# Patient Record
Sex: FEMALE | Race: WHITE | HISPANIC OR LATINO | Employment: UNEMPLOYED | URBAN - METROPOLITAN AREA
[De-identification: names, ages, dates, MRNs, and addresses within clinical notes are randomized per-mention and may not be internally consistent; named-entity substitution may affect disease eponyms.]

---

## 2021-08-16 ENCOUNTER — HOSPITAL ENCOUNTER (EMERGENCY)
Facility: HOSPITAL | Age: 56
Discharge: HOME | End: 2021-08-16
Attending: EMERGENCY MEDICINE | Admitting: EMERGENCY MEDICINE

## 2021-08-16 ENCOUNTER — APPOINTMENT (OUTPATIENT)
Dept: MRI IMAGING | Facility: HOSPITAL | Age: 56
End: 2021-08-16

## 2021-08-16 VITALS
SYSTOLIC BLOOD PRESSURE: 121 MMHG | OXYGEN SATURATION: 98 % | TEMPERATURE: 98 F | DIASTOLIC BLOOD PRESSURE: 88 MMHG | HEART RATE: 71 BPM | RESPIRATION RATE: 18 BRPM

## 2021-08-16 DIAGNOSIS — M54.41 ACUTE RIGHT-SIDED LOW BACK PAIN WITH RIGHT-SIDED SCIATICA: ICD-10-CM

## 2021-08-16 DIAGNOSIS — M54.16 RIGHT LUMBAR RADICULOPATHY: Primary | ICD-10-CM

## 2021-08-16 PROCEDURE — 96372 THER/PROPH/DIAG INJ SC/IM: CPT

## 2021-08-16 PROCEDURE — 72148 MRI LUMBAR SPINE W/O DYE: CPT

## 2021-08-16 PROCEDURE — 99283 EMERGENCY DEPT VISIT LOW MDM: CPT

## 2021-08-16 PROCEDURE — 25010000002 MORPHINE PER 10 MG: Performed by: NURSE PRACTITIONER

## 2021-08-16 PROCEDURE — 63710000001 ONDANSETRON ODT 4 MG TABLET DISPERSIBLE: Performed by: NURSE PRACTITIONER

## 2021-08-16 RX ORDER — METHYLPREDNISOLONE 4 MG/1
TABLET ORAL
Qty: 1 EACH | Refills: 0 | Status: SHIPPED | OUTPATIENT
Start: 2021-08-16 | End: 2022-11-13

## 2021-08-16 RX ORDER — METHOCARBAMOL 750 MG/1
750 TABLET, FILM COATED ORAL 3 TIMES DAILY PRN
Qty: 21 TABLET | Refills: 0 | Status: SHIPPED | OUTPATIENT
Start: 2021-08-16 | End: 2022-11-13

## 2021-08-16 RX ORDER — MORPHINE SULFATE 2 MG/ML
4 INJECTION, SOLUTION INTRAMUSCULAR; INTRAVENOUS ONCE
Status: COMPLETED | OUTPATIENT
Start: 2021-08-16 | End: 2021-08-16

## 2021-08-16 RX ORDER — ONDANSETRON 4 MG/1
4 TABLET, ORALLY DISINTEGRATING ORAL ONCE
Status: COMPLETED | OUTPATIENT
Start: 2021-08-16 | End: 2021-08-16

## 2021-08-16 RX ADMIN — MORPHINE SULFATE 4 MG: 2 INJECTION, SOLUTION INTRAMUSCULAR; INTRAVENOUS at 13:51

## 2021-08-16 RX ADMIN — ONDANSETRON 4 MG: 4 TABLET, ORALLY DISINTEGRATING ORAL at 13:50

## 2021-08-16 NOTE — ED TRIAGE NOTES
Pt to ER via PV. Pt states right lower back pain that started this past Thursday and goes down leg. Pt states it started after picking up granddaughter    Patient in mask. This RN in appropriate PPE throughout the patient's entire encounter.

## 2021-08-16 NOTE — ED PROVIDER NOTES
EMERGENCY DEPARTMENT ENCOUNTER    Room Number:  B08/08  Date of encounter:  8/16/2021  PCP: Provider, No Known  Historian: patient   Full history not obtainable due to: none     HPI:  Chief Complaint: Low back pain     Context: Sabrina Osman is a 56 y.o. female who presents to the ED c/o low back pain onset Thursday, 4 days prior. Pain is constant. Located in the right lower back. Radiates to posterior aspect of the RLE. Worse with walking and moving. She tried one of her friends Flexeril tablets which did help her pain a little.  No falls. No injury. No weakness. No fever. Does endorse tingling in her vagina. No incontinence.    Hx of intermittent back pain but no prior surgeries. No followed by pain management or spine surgery.     PAST MEDICAL HISTORY    Active Ambulatory Problems     Diagnosis Date Noted   • No Active Ambulatory Problems     Resolved Ambulatory Problems     Diagnosis Date Noted   • No Resolved Ambulatory Problems     No Additional Past Medical History         PAST SURGICAL HISTORY  No past surgical history on file.      FAMILY HISTORY  No family history on file.      SOCIAL HISTORY  Social History     Socioeconomic History   • Marital status: Single     Spouse name: Not on file   • Number of children: Not on file   • Years of education: Not on file   • Highest education level: Not on file         ALLERGIES  Patient has no known allergies.        REVIEW OF SYSTEMS  Review of Systems   All systems reviewed and marked as negative except as listed in HPI       PHYSICAL EXAM    I have reviewed the triage vital signs and nursing notes.    ED Triage Vitals [08/16/21 1152]   Temp Heart Rate Resp BP SpO2   98 °F (36.7 °C) 106 18 -- 98 %      Temp src Heart Rate Source Patient Position BP Location FiO2 (%)   -- -- -- -- --       GENERAL: alert well developed, well nourished in no distress  HENT: NCAT, neck supple, trachea midline  EYES: no scleral icterus, PERRL, normal conjunctivae  CV: regular rhythm,  regular rate, no murmur  RESPIRATORY: unlabored effort, CTAB  ABDOMEN: soft, nontender, nondistended, bowel sounds present  MUSCULOSKELETAL: no gross deformity. No vertebral tenderness on palpation. No step off or crepitus. No spasm of the parspinous  muscles. SLR +at 20degrees RLE, LLE negative. Normal strength of the bilateral lower extremities. Unable to elicit Patellar DTR bilaterally. DP and PT pulse 2+. No foot drop.  NEURO: alert,  sensory and motor function of extremities grossly intact, speech clear, mental status normal/baseline  SKIN: warm, dry, no rash  PSYCH:  Appropriate mood and affect    Vital signs and nursing notes reviewed.          LAB RESULTS  No results found for this or any previous visit (from the past 24 hour(s)).    Ordered the above labs and independently reviewed the results.        RADIOLOGY  MRI Lumbar Spine Without Contrast    Result Date: 8/16/2021  EMERGENCY MRI OF THE LUMBAR SPINE WITHOUT CONTRAST 08/16/2021  CLINICAL HISTORY: Patient has low back pain the past 4 days that radiates down posterior right lower extremity and has saddle anesthesia, rule out cauda equina syndrome.  TECHNIQUE: Sagittal T1, proton density and fat-suppressed T2-weighted images were obtained of the lumbar spine. In addition axial T2-weighted images were obtained from L1 S2, thin cut axial T1-weighted images were obtained angled through the interspaces from L1-S1.  COMPARISON: There are no prior MRIs of the lumbar spine for comparison.  FINDINGS: The distal thoracic cord and the conus is normal in signal intensity. The conus terminates at the L1-2 interspace level which is normal.  At T11-12 no axial images were obtained but based on the sagittal images the disc space and facets appear normal with no canal or foraminal narrowing.  At T12-L1 no axial images were obtained but based on the sagittal images the disc space and facets appear normal with no canal or foraminal narrowing.  At L1-2 the disc space and  facets are normal with no canal or foraminal narrowing.  At L2-3 the disc space and facets are normal with no canal or foraminal narrowing.  At L3-4 there is some mild disc desiccation. There is some T2 high signal in the posterior central annulus compatible with posterior central annular fissure or tear with a tiny posterior central disc protrusion that only minimally indents the anterior midline thecal sac. There is essentially no canal or foraminal narrowing.  At L4-5 there is minimal bilateral facet overgrowth. There is mild disc space narrowing, there are some degenerative endplate changes. There is some focal T2 high signal with T1 low signal in the anterior endplates compatible with some focal marrow edema in the anterior endplates that is probably degenerative marrow edema. There is 2 mm retrolisthesis of L4 on L5 and there is a posterior central disc protrusion or small disc herniation that indents the anterior midline of the thecal sac, only mildly narrows the thecal sac, comes close to the anteromedial aspect of the traversing right L5 nerve root but does not appear to compress it. There is no left foraminal narrowing. There is slight spurring and bulging disc material into the right foramen with mild right foraminal narrowing.  At L5-S1 there is mild disc space narrowing and mild posterior disc bulge, minimal facet overgrowth. There is no canal or lateral recess narrowing and there is some minimal spurring and bulging disc material in the foramen and mild bilateral foraminal narrowing.  Paravertebral soft tissue structures appear normal.      1. Disc spaces and facets are normal with no canal or foraminal narrowing from T11 to L3. 2. There is a posterior central annular fissure or tear at L3-4 with posterior central disc bulge or small disc protrusion that only minimally indents the anterior midline of the thecal sac but there is no canal or foraminal narrowing at L3-4. 3. There is some disc space  narrowing and mild degenerative endplate changes at L4-5 with some degenerative marrow edema in the anterior endplates. There is a 2 mm retrolisthesis of L4 with respect to L5 and there is diffuse posterior disc bulge with eccentric bulging or mildly protruding disc material right paracentral and posterolaterally mildly narrows the central right side of the thecal sac and may abut the ventral aspect of the traversing right L5 nerve root but does not appear to compress it. There is some mild spurring and bulging disc material into the right neural foramen with mild right foraminal narrowing at L4-5. 4. At L5-S1 there is mild diffuse posterior disc bulge that abuts the ventral aspect of the traversing S1 nerve roots but does not compress them. There is no canal or lateral recess narrowing. There is some spurring and bulging disc material into the foramina with mild bilateral foraminal narrowing. The remainder of the lumbar spine MRI is unremarkable. The results were communicated to CRISTOPHER Shaffer by telephone on 08/16/2021 at 3 PM        I ordered the above noted radiological studies. Independently reviewed by me and discussed with radiologist.  See dictation above for official radiology interpretation.      PROCEDURES    Procedures        MEDICATIONS GIVEN IN ER    Medications   morphine injection 4 mg (4 mg Intramuscular Given 8/16/21 1351)   ondansetron ODT (ZOFRAN-ODT) disintegrating tablet 4 mg (4 mg Oral Given 8/16/21 1350)         PROGRESS, DATA ANALYSIS, CONSULTS, AND MEDICAL DECISION MAKING    All labs have been independently reviewed by me.  All radiology studies have been reviewed by me.   EKG's independently reviewed by me.  Discussion below represents my analysis of pertinent findings related to patient's condition, differential diagnosis, treatment plan and final disposition.    DIFFERENTIAL DIAGNOSIS INCLUDE BUT NOT LIMITED TO: Lumbago, muscle spasm, vertebral fracture, spinal stenosis,  lumbar radiculopathy, cauda equina syndrome, DDD, AAA, retroperitoneal hematoma, SBO, diverticulitis, UTI      ED Course as of Aug 16 2011   Mon Aug 16, 2021   1330 Given complaint of saddle anesthesia and hyporeflexia, will proceed with MRI lumbar spine.     [JS]   1500 I discussed patient with Dr. Layton who states the patient has findings of disc protrusion at L4 L5-S1 but no definite innervation of the exiting nerve root.    [JS]   1530 Discussed patient with Lola for neurosurgery.  She is on-call for Dr. Osullivan.  She states patient is appropriate for discharge.  They can follow-up in the office for clinic appointment.  She is in agreement with plan for steroids and muscle relaxers.  She states her doctor is reviewed the films and there is no indication of cauda equina syndrome.    [JS]   1545 I discussed MRI results with the patient and plan for discharge from the emergency department.  She affirms she is able to walk although she does report pain with ambulation.  We discussed close follow-up with her family physician and referral to neurosurgery.  She will be given prescriptions for steroids and muscle relaxers and activity restriction.  Return precautions given she is understanding the plan.    [JS]      ED Course User Index  [JS] Delphine Flores APRN       AS OF 20:11 EDT VITALS:        BP - 121/88  HR - 71  TEMP - 98 °F (36.7 °C)  O2 SATS - 98%         Medication List      New Prescriptions    methocarbamol 750 MG tablet  Commonly known as: ROBAXIN  Take 1 tablet by mouth 3 (Three) Times a Day As Needed for Muscle Spasms.     methylPREDNISolone 4 MG dose pack  Commonly known as: MEDROL  Take as directed on package instructions.           Where to Get Your Medications      You can get these medications from any pharmacy    Bring a paper prescription for each of these medications  · methocarbamol 750 MG tablet  · methylPREDNISolone 4 MG dose pack           DIAGNOSIS  Final diagnoses:   Right lumbar  radiculopathy   Acute right-sided low back pain with right-sided sciatica         DISPOSITION  Discharge    Pt masked in first look. I wore appropriate PPE throughout my encounters with the pt. I performed hand hygiene on entry into the pt room and upon exit.     Dictated utilizing Dragon dictation:  Much of this encounter note is an electronic transcription/translation of spoken language to printed text. The electronic translation of spoken language may permit erroneous, or at times, nonsensical words or phrases to be inadvertently transcribed; Although I have reviewed the note for such errors, some may still exist.     Delphine Flores, CRISTOPHER  08/16/21 2011

## 2021-08-16 NOTE — ED PROVIDER NOTES
I have supervised the care provided by the midlevel provider.    We have discussed this patient's history, physical exam, and treatment plan.   I have reviewed the note and have personally examined the patient and agree with the plan of care.  See attached attending note.  My personal findings are below:    Patient complains of right-sided low back pain for the past 4 days.  Denies recent injury.  Pain radiates into the right buttock and right thigh.  Reports tingling in her vagina.  Denies loss of bowel/bladder control, fever, or numbness/tingling/weakness in her legs.    On exam: Awake and alert.  Heart is regular rate and rhythm.  Lungs are clear bilaterally.  Abdomen soft and nontender.  Back is nontender.  There is normal strength with bilateral hip flexion/extension, knee flexion/extension, dorsi/plantarflexion of the ankle, and EHL.  Normal light touch sensation of both lower extremities.        MRI of the lumbar spine shows a posterior central disc protrusion at L4/5 that comes close to the anterior medial aspect of the traversing right L5 nerve root.  Full report for details.     Oskar Greenberg MD  08/16/21 3343

## 2021-08-16 NOTE — ED NOTES
Pt reports R sided lower back pain with radiation into her right leg that started 5 days ago.       Sung Cagle, RN  08/16/21 3721

## 2021-08-18 ENCOUNTER — TELEPHONE (OUTPATIENT)
Dept: NEUROSURGERY | Facility: CLINIC | Age: 56
End: 2021-08-18

## 2021-08-18 NOTE — TELEPHONE ENCOUNTER
"PATIENT CALLED AGAIN TODAY, 48 HOURS AFTER INITIAL TELEPHONE ENCOUNTER. STATED SHE IS IN EXTREME AMOUNT OF PAIN AND IS NEEDING TO FOLLOW-UP FROM HER ED VISIT TO SEE DR. BANERJEE (NEUROSURGERY ON-CALL) AS A PART OF HER DISCHARGE INSTRUCTIONS.     WOULD HAVE SCHEDULED FOR HOSPITAL FOLLOW-UP, BUT UNABLE TO DUE TO APPT CRITERIA.     PLEASE ADVISE ON NEXT ACTION OR REACH OUT TO PATIENT, [phone].    THANK YOU.          Caller: Sabrina Osman    Relationship to patient: Self    Best call back number: [phone]    New or established patient?  [x] New  [x] Established    Date of discharge: 08/16/2021    Facility discharged from: [facility]    Diagnosis/Symptoms: Right lumbar radiculopathy with right sided sciatica    Length of stay (If applicable): <24 hours    Specialty Only: Did you see a [facility] health provider?    [x] Yes  [] No  If so, who? CRISTOPHER Velázquez consulted and stated the following:  \"Discussed patient with Lola for neurosurgery.  She is on-call for Dr. Osullivan.  She states patient is appropriate for discharge.  They can follow-up in the office for clinic appointment.  She is in agreement with plan for steroids and muscle relaxers.  She states her doctor is reviewed the films and there is no indication of cauda equina syndrome. \"    PLEASE CALL PATIENT TO SCHEDULE HOSPITAL FOLLOW-UP APPT.    THANK YOU.  "

## 2021-08-23 NOTE — TELEPHONE ENCOUNTER
PT CALLED AND STATES THAT SHE IS WAITING FOR A CALL BACK FROM OUR OFFICE FOR AN APPT. PT STATES THAT SHE IS ABOUT TO RUN OUT OF MEDICATION AND SHE IS IN SEVERE PAIN-PLEASE ADVISE THANK YOU!

## 2021-08-26 ENCOUNTER — OFFICE VISIT (OUTPATIENT)
Dept: NEUROSURGERY | Facility: CLINIC | Age: 56
End: 2021-08-26

## 2021-08-26 VITALS
HEIGHT: 65 IN | BODY MASS INDEX: 25.83 KG/M2 | SYSTOLIC BLOOD PRESSURE: 120 MMHG | WEIGHT: 155 LBS | HEART RATE: 89 BPM | TEMPERATURE: 97.8 F | DIASTOLIC BLOOD PRESSURE: 82 MMHG

## 2021-08-26 DIAGNOSIS — M51.16 NEURITIS OR RADICULITIS DUE TO RUPTURE OF LUMBAR INTERVERTEBRAL DISC: Primary | ICD-10-CM

## 2021-08-26 PROCEDURE — 99204 OFFICE O/P NEW MOD 45 MIN: CPT | Performed by: NEUROLOGICAL SURGERY

## 2021-08-27 ENCOUNTER — TELEPHONE (OUTPATIENT)
Dept: NEUROSURGERY | Facility: CLINIC | Age: 56
End: 2021-08-27

## 2021-08-31 ENCOUNTER — APPOINTMENT (OUTPATIENT)
Dept: PAIN MEDICINE | Facility: HOSPITAL | Age: 56
End: 2021-08-31

## 2021-09-13 ENCOUNTER — APPOINTMENT (OUTPATIENT)
Dept: PAIN MEDICINE | Facility: HOSPITAL | Age: 56
End: 2021-09-13

## 2021-09-27 ENCOUNTER — APPOINTMENT (OUTPATIENT)
Dept: PAIN MEDICINE | Facility: HOSPITAL | Age: 56
End: 2021-09-27

## 2022-05-13 ENCOUNTER — HOSPITAL ENCOUNTER (EMERGENCY)
Facility: HOSPITAL | Age: 57
End: 2022-05-13

## 2022-05-13 ENCOUNTER — HOSPITAL ENCOUNTER (OUTPATIENT)
Dept: CARDIOLOGY | Facility: HOSPITAL | Age: 57
Discharge: HOME | End: 2022-05-13
Admitting: FAMILY MEDICINE

## 2022-05-13 ENCOUNTER — TRANSCRIBE ORDERS (OUTPATIENT)
Dept: ADMINISTRATIVE | Facility: HOSPITAL | Age: 57
End: 2022-05-13

## 2022-05-13 DIAGNOSIS — Z01.810 PRE-OPERATIVE CARDIOVASCULAR EXAMINATION: ICD-10-CM

## 2022-05-13 DIAGNOSIS — Z01.818 PRE-OP TESTING: ICD-10-CM

## 2022-05-13 DIAGNOSIS — Z01.818 PRE-OP TESTING: Primary | ICD-10-CM

## 2022-05-13 LAB — QT INTERVAL: 393 MS

## 2022-05-13 PROCEDURE — 93010 ELECTROCARDIOGRAM REPORT: CPT | Performed by: INTERNAL MEDICINE

## 2022-05-13 PROCEDURE — 93005 ELECTROCARDIOGRAM TRACING: CPT | Performed by: FAMILY MEDICINE

## 2022-07-15 ENCOUNTER — TELEPHONE (OUTPATIENT)
Dept: NEUROLOGY | Facility: OTHER | Age: 57
End: 2022-07-15

## 2022-07-15 NOTE — TELEPHONE ENCOUNTER
PT CALLED TO GET AN MRI READ THAT SHE HAD DONE IN [place]. SHE WANTS TO DO A MOMMY MAKEOVER.    GAVE HER THE CENTRAL SCHEDULING NUMBER TO REACH A RADIOLOGIST.

## 2022-09-13 ENCOUNTER — HOSPITAL ENCOUNTER (EMERGENCY)
Facility: HOSPITAL | Age: 57
Discharge: LEFT BEFORE DISCHARGE | End: 2022-09-14

## 2022-09-13 VITALS
HEIGHT: 65 IN | TEMPERATURE: 97.2 F | WEIGHT: 155 LBS | DIASTOLIC BLOOD PRESSURE: 93 MMHG | BODY MASS INDEX: 25.83 KG/M2 | RESPIRATION RATE: 16 BRPM | OXYGEN SATURATION: 96 % | HEART RATE: 74 BPM | SYSTOLIC BLOOD PRESSURE: 127 MMHG

## 2022-09-13 PROCEDURE — 99211 OFF/OP EST MAY X REQ PHY/QHP: CPT

## 2022-09-14 ENCOUNTER — APPOINTMENT (OUTPATIENT)
Dept: GENERAL RADIOLOGY | Facility: HOSPITAL | Age: 57
End: 2022-09-14

## 2022-09-14 ENCOUNTER — HOSPITAL ENCOUNTER (EMERGENCY)
Facility: HOSPITAL | Age: 57
Discharge: LEFT BEFORE DISCHARGE | End: 2022-09-14

## 2022-09-14 VITALS — OXYGEN SATURATION: 99 % | HEART RATE: 75 BPM | TEMPERATURE: 97.8 F | RESPIRATION RATE: 16 BRPM

## 2022-09-14 PROCEDURE — 99211 OFF/OP EST MAY X REQ PHY/QHP: CPT

## 2022-09-14 PROCEDURE — 73030 X-RAY EXAM OF SHOULDER: CPT

## 2022-09-14 NOTE — ED TRIAGE NOTES
Patient to ER via car from home. Patient was here in the middle of the night and left without being seen after xray. Patient states that she has right shoulder pain that started yesterday around 3pm with no known injury    Patient wearing mask this RN in PPE

## 2022-09-14 NOTE — ED TRIAGE NOTES
Pt states around 1530 this afternoon she has left shouder pain that has not gotten any better with Motrin. Pt states that it is hard to move her arm now since the arm pain this afternoon.     Pt masked and staff masked.

## 2022-11-13 ENCOUNTER — HOSPITAL ENCOUNTER (EMERGENCY)
Facility: HOSPITAL | Age: 57
Discharge: HOME | End: 2022-11-13
Attending: EMERGENCY MEDICINE | Admitting: EMERGENCY MEDICINE

## 2022-11-13 VITALS
TEMPERATURE: 98.2 F | SYSTOLIC BLOOD PRESSURE: 123 MMHG | RESPIRATION RATE: 18 BRPM | HEART RATE: 75 BPM | OXYGEN SATURATION: 98 % | DIASTOLIC BLOOD PRESSURE: 80 MMHG

## 2022-11-13 DIAGNOSIS — S01.01XA LACERATION OF SCALP, INITIAL ENCOUNTER: ICD-10-CM

## 2022-11-13 DIAGNOSIS — S09.90XA TRAUMATIC INJURY OF HEAD, INITIAL ENCOUNTER: Primary | ICD-10-CM

## 2022-11-13 PROCEDURE — 99282 EMERGENCY DEPT VISIT SF MDM: CPT

## 2022-11-13 PROCEDURE — 90471 IMMUNIZATION ADMIN: CPT | Performed by: EMERGENCY MEDICINE

## 2022-11-13 PROCEDURE — 25010000002 TETANUS-DIPHTH-ACELL PERTUSSIS 5-2.5-18.5 LF-MCG/0.5 SUSPENSION PREFILLED SYRINGE: Performed by: EMERGENCY MEDICINE

## 2022-11-13 PROCEDURE — 90715 TDAP VACCINE 7 YRS/> IM: CPT | Performed by: EMERGENCY MEDICINE

## 2022-11-13 RX ADMIN — Medication 3 ML: at 19:49

## 2022-11-13 RX ADMIN — TETANUS TOXOID, REDUCED DIPHTHERIA TOXOID AND ACELLULAR PERTUSSIS VACCINE, ADSORBED 0.5 ML: 5; 2.5; 8; 8; 2.5 SUSPENSION INTRAMUSCULAR at 19:49

## 2022-11-14 NOTE — ED PROVIDER NOTES
EMERGENCY DEPARTMENT ENCOUNTER    Room Number:  39/39  Date of encounter:  11/13/2022  PCP: Provider, No Known  Historian: Patient    Patient was placed in face mask during triage process. Patient was wearing facemask when I entered the room and throughout our encounter. I wore full protective equipment throughout this patient encounter including a face mask, eye protection, and gloves. Hand hygiene was performed before donning protective equipment and again following doffing of PPE after leaving the room.    HPI:  Chief Complaint: Head injury with laceration  A complete HPI/ROS/PMH/PSH/SH/FH are unobtainable due to: N/A   Context: Sabrina Osman is a 57 y.o. female who presents to the ED c/o head injury with laceration shortly prior to arrival.  Patient reports that she was cleaning out her refrigerator, and when she stood up quickly she hit the top of her head on the edge of the refrigerator door sustaining an injury.  No loss of consciousness or altered mental status.  Patient denies significant headache, vision change, focal numbness weakness, or even nausea.  No exacerbating or relieving factors.  Patient was concerned that she might need some stitches.  Last known tetanus immunization greater than 5 years ago.  0 out of 10 pain at this time.      MEDICAL HISTORY REVIEW  EMR reviewed:    PAST MEDICAL HISTORY  Active Ambulatory Problems     Diagnosis Date Noted   • Neuritis or radiculitis due to rupture of lumbar intervertebral disc 08/26/2021     Resolved Ambulatory Problems     Diagnosis Date Noted   • No Resolved Ambulatory Problems     No Additional Past Medical History         PAST SURGICAL HISTORY  No past surgical history on file.      FAMILY HISTORY  No family history on file.      SOCIAL HISTORY  Social History     Socioeconomic History   • Marital status: Single         ALLERGIES  Septra [sulfamethoxazole-trimethoprim] and Sulfa antibiotics        REVIEW OF SYSTEMS  Review of Systems     All systems  reviewed and negative except for those discussed in HPI.       PHYSICAL EXAM    I have reviewed the triage vital signs and nursing notes.    ED Triage Vitals   Temp Heart Rate Resp BP SpO2   11/13/22 1922 11/13/22 1921 11/13/22 1921 -- 11/13/22 1921   98.2 °F (36.8 °C) 86 18  98 %      Temp src Heart Rate Source Patient Position BP Location FiO2 (%)   11/13/22 1922 11/13/22 1921 -- -- --   Tympanic Monitor          Physical Exam  HENT:      Head:           Physical Exam   Constitutional: No distress.  Very pleasant.  HENT:  Head: Normocephalic.  No evidence of basilar skull fracture on examination.  Oropharynx: Mucous membranes are moist.   Eyes: No scleral icterus. No conjunctival pallor.  PERRL, EOMI  Neck: Painless range of motion noted. Neck supple.   Cardiovascular: Normal rate, regular rhythm and intact distal pulses.  Pulmonary/Chest: No respiratory distress.  No tachypnea or increased work of breathing.    Musculoskeletal: Moves all extremities equally.  Atraumatic extremities  neurological: Alert.  Face symmetric and speech fluent  Skin: Skin is pink, warm, and dry. No pallor.   Psychiatric: Mood and affect normal.   Nursing note and vitals reviewed.    LAB RESULTS  No results found for this or any previous visit (from the past 24 hour(s)).    Ordered the above labs and independently reviewed the results.        RADIOLOGY  No Radiology Exams Resulted Within Past 24 Hours    I ordered the above noted radiological studies. Reviewed by me and discussed with radiologist.  See dictation for official radiology interpretation.      PROCEDURES    Laceration Repair    Date/Time: 11/13/2022 9:23 PM  Performed by: Cole Flores MD  Authorized by: Cole Flores MD     Consent:     Consent obtained:  Verbal    Consent given by:  Patient    Risks discussed:  Infection and pain  Anesthesia:     Anesthesia method:  Topical application    Topical anesthetic:  LET  Laceration details:     Location:  Scalp    Scalp  location:  Frontal    Length (cm):  1.3  Pre-procedure details:     Preparation:  Patient was prepped and draped in usual sterile fashion  Exploration:     Imaging outcome: foreign body not noted      Wound exploration: wound explored through full range of motion    Treatment:     Area cleansed with:  Saline    Amount of cleaning:  Extensive    Irrigation solution:  Sterile saline  Skin repair:     Repair method:  Staples    Number of staples:  2  Approximation:     Approximation:  Close  Post-procedure details:     Dressing:  Antibiotic ointment    Procedure completion:  Tolerated well, no immediate complications            MEDICATIONS GIVEN IN ER    Medications   Tetanus-Diphth-Acell Pertussis (BOOSTRIX) injection 0.5 mL (0.5 mL Intramuscular Given 11/13/22 1949)   Lido-EPINEPHrine-Tetracaine 4-0.18-0.5 % gel 3 mL (3 mL Topical Given 11/13/22 1949)         PROGRESS, DATA ANALYSIS, CONSULTS, AND MEDICAL DECISION MAKING    My differential diagnosis includes but is not limited to cerebral contusion, cervical strain, concussion with LOC, concussion without LOC, contusion, fracture of the skull, orbits or mandible, hematoma, intracranial hemorrhage including subdural, epidural, subarachnoid and intracerebral, laceration and postconcussion syndrome      All labs have been independently reviewed by me.  All radiology studies have been reviewed by me and discussed with radiologist dictating the report.   EKG's independently viewed and interpreted by me.  Discussion below represents my analysis of pertinent findings related to patient's condition, differential diagnosis, treatment plan and final disposition.           AS OF 21:24 EST VITALS:    BP -    HR - 86  TEMP - 98.2 °F (36.8 °C) (Tympanic)  O2 SATS - 98%        DIAGNOSIS  Final diagnoses:   Traumatic injury of head, initial encounter   Laceration of scalp, initial encounter         DISPOSITION  DISCHARGE    Patient discharged in stable condition.    Reviewed  implications of results, diagnosis, meds, responsibility to follow up, warning signs and symptoms of possible worsening, potential complications and reasons to return to ER.    Patient/Family voiced understanding of above instructions.    Discussed plan for discharge, as there is no emergent indication for admission. Patient referred to primary care provider for regular health maintenance. Pt/family is agreeable and understands need for follow up and possible repeat testing.  Pt is aware that discharge does not mean that nothing is wrong but it indicates no emergency is present that requires admission and they must continue care with follow-up as given below or physician of their choice.     FOLLOW-UP  Your primary care provider    Schedule an appointment as soon as possible for a visit in 1 week  For wound re-check and staple removal         Medication List      No changes were made to your prescriptions during this visit.            Cole Flores MD  11/13/22 5698

## 2022-11-14 NOTE — ED NOTES
Pt arrived by PV reports hitting head on refrigerator door. Bleeding controled, denies LOC denies blood thinners.    Patient was placed in face mask during first look triage.  Patient was wearing a face mask throughout encounter.  I wore personal protective equipment throughout the encounter.  Hand hygiene was performed before and after patient encounter.

## 2023-01-19 ENCOUNTER — APPOINTMENT (OUTPATIENT)
Dept: GENERAL RADIOLOGY | Facility: HOSPITAL | Age: 58
End: 2023-01-19
Payer: MEDICAID

## 2023-01-19 ENCOUNTER — HOSPITAL ENCOUNTER (EMERGENCY)
Facility: HOSPITAL | Age: 58
Discharge: HOME | End: 2023-01-19
Attending: EMERGENCY MEDICINE | Admitting: EMERGENCY MEDICINE
Payer: MEDICAID

## 2023-01-19 VITALS
OXYGEN SATURATION: 96 % | RESPIRATION RATE: 20 BRPM | HEIGHT: 65 IN | SYSTOLIC BLOOD PRESSURE: 133 MMHG | WEIGHT: 155 LBS | HEART RATE: 96 BPM | DIASTOLIC BLOOD PRESSURE: 98 MMHG | TEMPERATURE: 99.1 F | BODY MASS INDEX: 25.83 KG/M2

## 2023-01-19 DIAGNOSIS — J98.8 VIRAL RESPIRATORY ILLNESS: Primary | ICD-10-CM

## 2023-01-19 DIAGNOSIS — B97.89 VIRAL RESPIRATORY ILLNESS: Primary | ICD-10-CM

## 2023-01-19 LAB
FLUAV SUBTYP SPEC NAA+PROBE: NOT DETECTED
FLUBV RNA ISLT QL NAA+PROBE: NOT DETECTED
S PYO AG THROAT QL: NEGATIVE
SARS-COV-2 RNA PNL SPEC NAA+PROBE: NOT DETECTED

## 2023-01-19 PROCEDURE — 99283 EMERGENCY DEPT VISIT LOW MDM: CPT

## 2023-01-19 PROCEDURE — 71046 X-RAY EXAM CHEST 2 VIEWS: CPT

## 2023-01-19 PROCEDURE — 87636 SARSCOV2 & INF A&B AMP PRB: CPT | Performed by: PHYSICIAN ASSISTANT

## 2023-01-19 PROCEDURE — 87880 STREP A ASSAY W/OPTIC: CPT | Performed by: PHYSICIAN ASSISTANT

## 2023-01-19 PROCEDURE — 87081 CULTURE SCREEN ONLY: CPT | Performed by: PHYSICIAN ASSISTANT

## 2023-01-19 RX ORDER — PREDNISONE 50 MG/1
50 TABLET ORAL DAILY
Qty: 5 TABLET | Refills: 0 | Status: SHIPPED | OUTPATIENT
Start: 2023-01-19 | End: 2023-01-24

## 2023-01-19 RX ORDER — BENZONATATE 200 MG/1
200 CAPSULE ORAL 3 TIMES DAILY PRN
Qty: 30 CAPSULE | Refills: 0 | Status: SHIPPED | OUTPATIENT
Start: 2023-01-19

## 2023-01-19 RX ORDER — IBUPROFEN 800 MG/1
800 TABLET ORAL ONCE
Status: COMPLETED | OUTPATIENT
Start: 2023-01-19 | End: 2023-01-19

## 2023-01-19 RX ADMIN — IBUPROFEN 800 MG: 800 TABLET, FILM COATED ORAL at 14:12

## 2023-01-19 NOTE — ED TRIAGE NOTES
Patient to er from home with c/o sore throat along with productive cough that started three days ago.  Patient has mask on in triage along with staff.

## 2023-01-19 NOTE — ED PROVIDER NOTES
EMERGENCY DEPARTMENT ENCOUNTER    Room Number:  T03/03  Date seen:  1/19/2023  PCP: Provider, No Known  Historian: Patient      HPI:  Chief Complaint: Cough, pharyngitis  A complete HPI/ROS/PMH/PSH/SH/FH are unobtainable due to: None  Context: Sabrina Osman is a 57 y.o. female who presents to the ED c/o cough, sore throat for the past 3 days.  Patient reports cough is productive.  She reports pain is with swallowing.  She additionally reports some bilateral ear pain and congestion.  She denies significant shortness of breath, chest pain.  She took ibuprofen last night which provided some relief.  She denies any known sick contacts.  She has not had any known fevers at home.            PAST MEDICAL HISTORY  Active Ambulatory Problems     Diagnosis Date Noted   • Neuritis or radiculitis due to rupture of lumbar intervertebral disc 08/26/2021     Resolved Ambulatory Problems     Diagnosis Date Noted   • No Resolved Ambulatory Problems     No Additional Past Medical History         PAST SURGICAL HISTORY  No past surgical history on file.      FAMILY HISTORY  No family history on file.      SOCIAL HISTORY  Social History     Socioeconomic History   • Marital status: Single         ALLERGIES  Septra [sulfamethoxazole-trimethoprim] and Sulfa antibiotics        REVIEW OF SYSTEMS  Review of Systems   Constitutional: Negative for chills and fever.   HENT: Positive for congestion, ear pain and sore throat.    Respiratory: Positive for cough.    Cardiovascular: Negative for chest pain and leg swelling.   Gastrointestinal: Negative for abdominal pain.        PHYSICAL EXAM  ED Triage Vitals [01/19/23 1347]   Temp Heart Rate Resp BP SpO2   99.1 °F (37.3 °C) 119 20 -- 96 %      Temp src Heart Rate Source Patient Position BP Location FiO2 (%)   Tympanic -- -- -- --       Physical Exam      GENERAL: Nontoxic, no acute distress  HENT: nares patent, mild pharyngeal erythema, no tonsillar exudate, uvula midline, TMs nonerythematous  bilaterally  EYES: no scleral icterus  CV: regular rhythm, normal rate  RESPIRATORY: normal effort, occasional productive cough, CTA B  ABDOMEN: soft, nontender  MUSCULOSKELETAL: no deformity, no edema  NEURO: alert, moves all extremities, follows commands  PSYCH:  calm, cooperative  SKIN: warm, dry    Vital signs and nursing notes reviewed.          LAB RESULTS  Labs Reviewed   COVID-19 AND FLU A/B, NP SWAB IN TRANSPORT MEDIA 8-12 HR TAT - Normal    Narrative:     Fact sheet for providers: https://www.fda.gov/media/555653/download    Fact sheet for patients: https://www.fda.gov/media/144873/download    Test performed by PCR.   RAPID STREP A SCREEN - Normal   BETA HEMOLYTIC STREP CULTURE, THROAT - Normal    Narrative:     Group A Strep incidence is low in adults. Positive culture for Beta hemolytic Streptococcus species can reflect colonization and not true infection. Please correlate clinically.       Ordered the above labs and reviewed the results.        RADIOLOGY  XR Chest 2 View    Result Date: 1/19/2023  XR CHEST 2 VW-  01/19/2023  HISTORY: Cough.  Heart size is within normal limits. Lungs appear free of acute infiltrates. Bones and soft tissues are unremarkable.      1. No acute process.  This report was finalized on 1/19/2023 2:10 PM by Dr. Laureano Flanagan M.D.        Ordered the above noted radiological studies. Reviewed by me in PACS.            PROCEDURES  Procedures        MEDICATIONS GIVEN IN ER  Medications   ibuprofen (ADVIL,MOTRIN) tablet 800 mg (800 mg Oral Given 1/19/23 1412)                   MEDICAL DECISION MAKING, PROGRESS, and CONSULTS    All labs have been independently reviewed by me.  All radiology studies have been reviewed by me and I have also reviewed the radiology report.   EKG's independently viewed and interpreted by me.  Discussion below represents my analysis of pertinent findings related to patient's condition, differential diagnosis, treatment plan and final  disposition.      Additional sources:    - External (non-ED) record review: Reviewed urgent care visit from 5/3/2022 for sore throat and ear pain. Diagnosed with otitis media. Started on amoxicillin.      Orders placed during this visit:  Orders Placed This Encounter   Procedures   • COVID-19 and FLU A/B PCR - Swab, Nasopharynx   • Rapid Strep A Screen - Swab, Throat   • XR Chest 2 View   • Vital Signs Recheck           Differential diagnosis:    Cough   Viral upper respiratory infection   Postnasal drip   Postinfectious, postviral cough   Bronchitis   Asthma, chronic obstructive pulmonary disease   Gastroesophageal reflux   Drugs (angiotensin converting enzyme inhibitors, beta blockers, amiodarone)   Pneumonia   Ear cough (external or middle ear disease)   Aspiration, recurrent   Pulmonary embolus   Idiopathic chronic cough   Pertussis   Smoker’s cough   Psychogenic (habit cough)   Heart failure   Human immunodeficiency virus   Lung tumor   Epiglottitis   Acute histoplasmosis   Interstitial lung disease   Bronchiectasis   Non-asthmatic eosinophilic bronchitis   Sarcoidosis   Tuberculosis   Hypersensitivity pneumonitis, pneumoconiosis   Aspergillosis   Cystic fibrosis   Laryngeal tumor   Mitral stenosis   Foreign body   Premature ventricular contractions   Severe Acute Respiratory Syndrome (SARS)           Independent interpretation of labs, radiology studies, and discussions with consultants:  ED Course as of 01/21/23 0933   Thu Jan 19, 2023   1421 XR Chest 2 View  I have independently reviewed the imaging in PACS. My interpretation is no pneumothorax.   [DC]   1446 Strep A Ag: Negative [DC]   1456 COVID19: Not Detected [DC]   1456 Influenza A PCR: Not Detected [DC]   1456 Influenza B PCR: Not Detected [DC]   1504 Discussed lab and imaging results with patient. Will treat with course of prednisone, tessalon. Pt to follow up for reevaluation with PCP. She states she is a nonsmoker. Symptoms have been going on for 3  days and are suggestive of viral illness. [DC]      ED Course User Index  [DC] Stephanie Chow PA             Patient was placed in face mask in first look. Patient was wearing facemask when I entered the room and throughout our encounter. I wore full protective equipment throughout this patient encounter including a face mask, and gloves. Hand hygiene was performed before donning protective equipment and after removal when leaving the room.      DIAGNOSIS  Final diagnoses:   Viral respiratory illness         DISPOSITION  Discharge            Latest Documented Vital Signs:  As of 09:33 EST  BP- 133/98 HR- 96 Temp- 99.1 °F (37.3 °C) (Tympanic) O2 sat- 96%              --    Please note that portions of this were completed with a voice recognition program.       Note Disclaimer: At [facility], we believe that sharing information builds trust and better relationships. You are receiving this note because you are receiving care at [facility] or recently visited. It is possible you will see health information before a provider has talked with you about it. This kind of information can be easy to misunderstand. To help you fully understand what it means for your health, we urge you to discuss this note with your provider.           Stephanie Chow PA  01/21/23 0967

## 2023-01-21 LAB — BACTERIA SPEC AEROBE CULT: NORMAL

## 2023-08-24 ENCOUNTER — HOSPITAL ENCOUNTER (EMERGENCY)
Facility: HOSPITAL | Age: 58
Discharge: HOME | End: 2023-08-24
Attending: EMERGENCY MEDICINE
Payer: COMMERCIAL

## 2023-08-24 ENCOUNTER — APPOINTMENT (OUTPATIENT)
Dept: GENERAL RADIOLOGY | Facility: HOSPITAL | Age: 58
End: 2023-08-24
Payer: COMMERCIAL

## 2023-08-24 VITALS
BODY MASS INDEX: 24.16 KG/M2 | TEMPERATURE: 98.9 F | WEIGHT: 145 LBS | HEIGHT: 65 IN | DIASTOLIC BLOOD PRESSURE: 94 MMHG | SYSTOLIC BLOOD PRESSURE: 136 MMHG | OXYGEN SATURATION: 100 % | RESPIRATION RATE: 18 BRPM | HEART RATE: 83 BPM

## 2023-08-24 DIAGNOSIS — R55 NEAR SYNCOPE: ICD-10-CM

## 2023-08-24 DIAGNOSIS — R07.9 CHEST PAIN, UNSPECIFIED TYPE: Primary | ICD-10-CM

## 2023-08-24 DIAGNOSIS — E86.0 DEHYDRATION: ICD-10-CM

## 2023-08-24 LAB
ALBUMIN SERPL-MCNC: 4.9 G/DL (ref 3.5–5.2)
ALBUMIN/GLOB SERPL: 1.8 G/DL
ALP SERPL-CCNC: 76 U/L (ref 39–117)
ALT SERPL W P-5'-P-CCNC: 25 U/L (ref 1–33)
ANION GAP SERPL CALCULATED.3IONS-SCNC: 12.2 MMOL/L (ref 5–15)
AST SERPL-CCNC: 89 U/L (ref 1–32)
BASOPHILS # BLD AUTO: 0.07 10*3/MM3 (ref 0–0.2)
BASOPHILS NFR BLD AUTO: 1 % (ref 0–1.5)
BILIRUB SERPL-MCNC: 0.5 MG/DL (ref 0–1.2)
BUN SERPL-MCNC: 24 MG/DL (ref 6–20)
BUN/CREAT SERPL: 23.8 (ref 7–25)
CALCIUM SPEC-SCNC: 9.8 MG/DL (ref 8.6–10.5)
CHLORIDE SERPL-SCNC: 105 MMOL/L (ref 98–107)
CO2 SERPL-SCNC: 25.8 MMOL/L (ref 22–29)
CREAT SERPL-MCNC: 1.01 MG/DL (ref 0.57–1)
DEPRECATED RDW RBC AUTO: 43.3 FL (ref 37–54)
EGFRCR SERPLBLD CKD-EPI 2021: 65.1 ML/MIN/1.73
EOSINOPHIL # BLD AUTO: 0.04 10*3/MM3 (ref 0–0.4)
EOSINOPHIL NFR BLD AUTO: 0.6 % (ref 0.3–6.2)
ERYTHROCYTE [DISTWIDTH] IN BLOOD BY AUTOMATED COUNT: 13.1 % (ref 12.3–15.4)
GEN 5 2HR TROPONIN T REFLEX: <6 NG/L
GLOBULIN UR ELPH-MCNC: 2.8 GM/DL
GLUCOSE SERPL-MCNC: 86 MG/DL (ref 65–99)
HCT VFR BLD AUTO: 40.9 % (ref 34–46.6)
HGB BLD-MCNC: 13.5 G/DL (ref 12–15.9)
HOLD SPECIMEN: NORMAL
HOLD SPECIMEN: NORMAL
IMM GRANULOCYTES # BLD AUTO: 0.02 10*3/MM3 (ref 0–0.05)
IMM GRANULOCYTES NFR BLD AUTO: 0.3 % (ref 0–0.5)
LYMPHOCYTES # BLD AUTO: 1.67 10*3/MM3 (ref 0.7–3.1)
LYMPHOCYTES NFR BLD AUTO: 24.9 % (ref 19.6–45.3)
MCH RBC QN AUTO: 29.7 PG (ref 26.6–33)
MCHC RBC AUTO-ENTMCNC: 33 G/DL (ref 31.5–35.7)
MCV RBC AUTO: 89.9 FL (ref 79–97)
MONOCYTES # BLD AUTO: 0.4 10*3/MM3 (ref 0.1–0.9)
MONOCYTES NFR BLD AUTO: 6 % (ref 5–12)
NEUTROPHILS NFR BLD AUTO: 4.5 10*3/MM3 (ref 1.7–7)
NEUTROPHILS NFR BLD AUTO: 67.2 % (ref 42.7–76)
NRBC BLD AUTO-RTO: 0 /100 WBC (ref 0–0.2)
PLATELET # BLD AUTO: 264 10*3/MM3 (ref 140–450)
PMV BLD AUTO: 10.3 FL (ref 6–12)
POTASSIUM SERPL-SCNC: 4.4 MMOL/L (ref 3.5–5.2)
PROT SERPL-MCNC: 7.7 G/DL (ref 6–8.5)
QT INTERVAL: 358 MS
RBC # BLD AUTO: 4.55 10*6/MM3 (ref 3.77–5.28)
SODIUM SERPL-SCNC: 143 MMOL/L (ref 136–145)
TROPONIN T DELTA: NORMAL
TROPONIN T SERPL HS-MCNC: 11 NG/L
WBC NRBC COR # BLD: 6.7 10*3/MM3 (ref 3.4–10.8)
WHOLE BLOOD HOLD COAG: NORMAL
WHOLE BLOOD HOLD SPECIMEN: NORMAL

## 2023-08-24 PROCEDURE — 99284 EMERGENCY DEPT VISIT MOD MDM: CPT

## 2023-08-24 PROCEDURE — 93010 ELECTROCARDIOGRAM REPORT: CPT | Performed by: STUDENT IN AN ORGANIZED HEALTH CARE EDUCATION/TRAINING PROGRAM

## 2023-08-24 PROCEDURE — 93005 ELECTROCARDIOGRAM TRACING: CPT | Performed by: EMERGENCY MEDICINE

## 2023-08-24 PROCEDURE — 36415 COLL VENOUS BLD VENIPUNCTURE: CPT | Performed by: EMERGENCY MEDICINE

## 2023-08-24 PROCEDURE — 71045 X-RAY EXAM CHEST 1 VIEW: CPT

## 2023-08-24 PROCEDURE — 80053 COMPREHEN METABOLIC PANEL: CPT | Performed by: EMERGENCY MEDICINE

## 2023-08-24 PROCEDURE — 84484 ASSAY OF TROPONIN QUANT: CPT | Performed by: EMERGENCY MEDICINE

## 2023-08-24 PROCEDURE — 85025 COMPLETE CBC W/AUTO DIFF WBC: CPT | Performed by: EMERGENCY MEDICINE

## 2023-08-24 PROCEDURE — 93005 ELECTROCARDIOGRAM TRACING: CPT

## 2023-08-24 RX ORDER — SODIUM CHLORIDE 0.9 % (FLUSH) 0.9 %
10 SYRINGE (ML) INJECTION AS NEEDED
Status: DISCONTINUED | OUTPATIENT
Start: 2023-08-24 | End: 2023-08-24 | Stop reason: HOSPADM

## 2023-08-24 RX ADMIN — SODIUM CHLORIDE 1000 ML: 9 INJECTION, SOLUTION INTRAVENOUS at 15:50
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
## 2023-11-04 NOTE — TELEPHONE ENCOUNTER
Caller: Sabrina Osman    Relationship: Self    Best call back number:[phone]    What is the best time to reach you:ANYTIME    Who are you requesting to speak with (clinical staff, provider,  specific staff member):CLINICAL    Do you know the name of the person who called:NA    What was the call regarding:PT CALLED AND STATES THAT SHE SAW  YESTERDAY-PT STATES HE FORGOT TO GIVE HER ANY PAIN MEDICATION-PT STATES SHE IS NEEDING SOMETHING TO LAST UNTIL HER INJECTION-PLEASE ADVISE THANK YOU!    Do you require a callback:YES           impaired balance/decreased strength